# Patient Record
Sex: FEMALE | Race: WHITE | NOT HISPANIC OR LATINO
[De-identification: names, ages, dates, MRNs, and addresses within clinical notes are randomized per-mention and may not be internally consistent; named-entity substitution may affect disease eponyms.]

---

## 2021-05-18 ENCOUNTER — RESULT REVIEW (OUTPATIENT)
Age: 26
End: 2021-05-18

## 2021-05-18 ENCOUNTER — OUTPATIENT (OUTPATIENT)
Dept: OUTPATIENT SERVICES | Facility: HOSPITAL | Age: 26
LOS: 1 days | End: 2021-05-18
Payer: COMMERCIAL

## 2021-05-18 VITALS
HEART RATE: 88 BPM | TEMPERATURE: 99 F | OXYGEN SATURATION: 99 % | WEIGHT: 179.9 LBS | DIASTOLIC BLOOD PRESSURE: 86 MMHG | RESPIRATION RATE: 16 BRPM | SYSTOLIC BLOOD PRESSURE: 130 MMHG

## 2021-05-18 DIAGNOSIS — Z90.49 ACQUIRED ABSENCE OF OTHER SPECIFIED PARTS OF DIGESTIVE TRACT: Chronic | ICD-10-CM

## 2021-05-18 DIAGNOSIS — Z01.818 ENCOUNTER FOR OTHER PREPROCEDURAL EXAMINATION: ICD-10-CM

## 2021-05-18 DIAGNOSIS — N87.1 MODERATE CERVICAL DYSPLASIA: ICD-10-CM

## 2021-05-18 DIAGNOSIS — R87.810 CERVICAL HIGH RISK HUMAN PAPILLOMAVIRUS (HPV) DNA TEST POSITIVE: ICD-10-CM

## 2021-05-18 LAB
HCG SERPL-ACNC: <1 MIU/ML — SIGNIFICANT CHANGE UP
HCT VFR BLD CALC: 38.7 % — SIGNIFICANT CHANGE UP (ref 34.5–45)
HGB BLD-MCNC: 13 G/DL — SIGNIFICANT CHANGE UP (ref 11.5–15.5)
MCHC RBC-ENTMCNC: 31.6 PG — SIGNIFICANT CHANGE UP (ref 27–34)
MCHC RBC-ENTMCNC: 33.6 GM/DL — SIGNIFICANT CHANGE UP (ref 32–36)
MCV RBC AUTO: 93.9 FL — SIGNIFICANT CHANGE UP (ref 80–100)
NRBC # BLD: 0 /100 WBCS — SIGNIFICANT CHANGE UP (ref 0–0)
PLATELET # BLD AUTO: 230 K/UL — SIGNIFICANT CHANGE UP (ref 150–400)
RBC # BLD: 4.12 M/UL — SIGNIFICANT CHANGE UP (ref 3.8–5.2)
RBC # FLD: 12.7 % — SIGNIFICANT CHANGE UP (ref 10.3–14.5)
WBC # BLD: 4.47 K/UL — SIGNIFICANT CHANGE UP (ref 3.8–10.5)
WBC # FLD AUTO: 4.47 K/UL — SIGNIFICANT CHANGE UP (ref 3.8–10.5)

## 2021-05-18 PROCEDURE — 88321 CONSLTJ&REPRT SLD PREP ELSWR: CPT

## 2021-05-18 PROCEDURE — G0463: CPT

## 2021-05-18 PROCEDURE — 84702 CHORIONIC GONADOTROPIN TEST: CPT

## 2021-05-18 PROCEDURE — 85027 COMPLETE CBC AUTOMATED: CPT

## 2021-05-18 PROCEDURE — 36415 COLL VENOUS BLD VENIPUNCTURE: CPT

## 2021-05-18 RX ORDER — OMEPRAZOLE 10 MG/1
0 CAPSULE, DELAYED RELEASE ORAL
Qty: 0 | Refills: 0 | DISCHARGE

## 2021-05-18 RX ORDER — NORETHINDRONE AND ETHINYL ESTRADIOL 0.4-0.035
0 KIT ORAL
Qty: 0 | Refills: 1 | DISCHARGE

## 2021-05-18 RX ORDER — DIPHENHYDRAMINE HCL 50 MG
1 CAPSULE ORAL
Qty: 0 | Refills: 0 | DISCHARGE

## 2021-05-18 NOTE — H&P PST ADULT - HEART RATE (BEATS/MIN)
Day surgery calling regarding orders being entered for an OB provider being present with her in surgery on 7/3. Patient has not been made aware of a provider being present and there have been no orders entered for Fetal heart tone monitoring. Day surgery would like a call back regarding which doctor will be present.   
Day surgery needs orders placed or called in to them for a pt having surgery July 3. RN messaged thru [facility]'s  ZAIDA Galvan  to have him call Vicki in day surgery at ext [phone] to give verbal orders. She states she will leave note on his desk. He is at [facility] today  
[phone]

## 2021-05-18 NOTE — H&P PST ADULT - HISTORY OF PRESENT ILLNESS
25 y/o female with no PMH here for PST. Pt states she had an abnormal PAP smear, s/p colposcopy and "pathology showed CLARA 2-3". Pt electing for leep procedure, endocervical curettage on 5/26/2021.

## 2021-05-18 NOTE — H&P PST ADULT - NSANTHOSAYNRD_GEN_A_CORE
No. RIKY screening performed.  STOP BANG Legend: 0-2 = LOW Risk; 3-4 = INTERMEDIATE Risk; 5-8 = HIGH Risk

## 2021-05-18 NOTE — H&P PST ADULT - NSICDXFAMILYHX_GEN_ALL_CORE_FT
FAMILY HISTORY:  Father  Still living? Yes, Estimated age: Age Unknown  Family history of kidney stones, Age at diagnosis: Age Unknown

## 2021-05-18 NOTE — H&P PST ADULT - GENERAL COMMENTS
Pt denies having traveled outside the country for the last 14 days. Pt denies having had the COVID19 infection and denies COVID19 positive contacts within the last 14 days. Pt received the 2nd dose of the Pfizer COVID19 vaccine on 4/2/2021.

## 2021-05-18 NOTE — H&P PST ADULT - NSICDXPROBLEM_GEN_ALL_CORE_FT
PROBLEM DIAGNOSES  Problem: Moderate cervical dysplasia  Assessment and Plan: Leep procedure, endocervical curettage on 5/26/2021.       Problem: Preoperative testing  Assessment and Plan: No medical clearance needed as per surgeon. CBC and HCG ordered. Pre-op instructions given and pt verbalized understanding. Pt had the 2nd dose of the Pfizer COVID19 vaccine on 4/2/2021 and does not need to have the COVID19 PCR swab before surgery.

## 2021-05-18 NOTE — H&P PST ADULT - NSICDXPASTMEDICALHX_GEN_ALL_CORE_FT
PAST MEDICAL HISTORY:  GERD (gastroesophageal reflux disease)     Insomnia     Moderate cervical dysplasia

## 2021-05-19 LAB — SURGICAL PATHOLOGY STUDY: SIGNIFICANT CHANGE UP

## 2021-05-23 ENCOUNTER — APPOINTMENT (OUTPATIENT)
Dept: DISASTER EMERGENCY | Facility: CLINIC | Age: 26
End: 2021-05-23

## 2023-06-22 PROBLEM — K21.9 GASTRO-ESOPHAGEAL REFLUX DISEASE WITHOUT ESOPHAGITIS: Chronic | Status: ACTIVE | Noted: 2021-05-18

## 2023-06-22 PROBLEM — Z00.00 ENCOUNTER FOR PREVENTIVE HEALTH EXAMINATION: Status: ACTIVE | Noted: 2023-06-22

## 2023-06-22 PROBLEM — G47.00 INSOMNIA, UNSPECIFIED: Chronic | Status: ACTIVE | Noted: 2021-05-18

## 2023-06-22 PROBLEM — N87.1 MODERATE CERVICAL DYSPLASIA: Chronic | Status: ACTIVE | Noted: 2021-05-18

## 2023-06-23 ENCOUNTER — APPOINTMENT (OUTPATIENT)
Dept: ORTHOPEDIC SURGERY | Facility: CLINIC | Age: 28
End: 2023-06-23
Payer: COMMERCIAL

## 2023-06-23 VITALS — WEIGHT: 170 LBS | BODY MASS INDEX: 28.32 KG/M2 | HEIGHT: 65 IN

## 2023-06-23 DIAGNOSIS — Z78.9 OTHER SPECIFIED HEALTH STATUS: ICD-10-CM

## 2023-06-23 DIAGNOSIS — F17.200 NICOTINE DEPENDENCE, UNSPECIFIED, UNCOMPLICATED: ICD-10-CM

## 2023-06-23 DIAGNOSIS — M23.91 UNSPECIFIED INTERNAL DERANGEMENT OF RIGHT KNEE: ICD-10-CM

## 2023-06-23 PROCEDURE — 99203 OFFICE O/P NEW LOW 30 MIN: CPT

## 2023-06-23 PROCEDURE — 73564 X-RAY EXAM KNEE 4 OR MORE: CPT | Mod: RT

## 2023-06-23 RX ORDER — OMEPRAZOLE 100 %
POWDER (GRAM) MISCELLANEOUS
Refills: 0 | Status: ACTIVE | COMMUNITY

## 2023-06-23 NOTE — DISCUSSION/SUMMARY
[de-identified] : 27yo F with Right medial knee pain s/p trip and fall with episodes of instability and positive lxey on exam \par 1) MRI to eval MMT, LMT, MCL \par 2) cryotherapy, nsaids prn\par 3) rtc after MRI to review results \par

## 2023-06-23 NOTE — HISTORY OF PRESENT ILLNESS
[5] : 5 [Tightness] : tightness [Leisure] : leisure [Work] : work [Meds] : meds [Walking] : walking [Exercising] : exercising [de-identified] : 6/23/23: Patient is a 27yo F presenting for R knee pain s/p fall onto R knee x 2 weeks ago. Patient states she got caught in the gap when entering the train and her R knee twisted and fell onto the ground. Initially, patient has some bruising, but had good movements but admits over the last two weeks her knee has "stiffened" causing pain with bending and flexing. Took Tylenol and ice with some relief. Admits to intermittent episodes of the knee locking up. 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 H/O two years ago impact on boat door. Pt stated two weeks ago she tripped and fell on the knee side ways. No N/T. ROM is slightly limited.  [FreeTextEntry7] : up the thigh [FreeTextEntry9] : Tylenol/ brace

## 2023-06-23 NOTE — PHYSICAL EXAM
[5___] : hamstring 5[unfilled]/5 [Positive] : positive Elvia [Right] : right knee [All Views] : anteroposterior, lateral, skyline, and anteroposterior standing [There are no fractures, subluxations or dislocations. No significant abnormalities are seen] : There are no fractures, subluxations or dislocations. No significant abnormalities are seen [] : negative Lachmann [TWNoteComboBox7] : flexion 130 degrees [de-identified] : extension 3 degrees

## 2023-07-07 ENCOUNTER — TRANSCRIPTION ENCOUNTER (OUTPATIENT)
Age: 28
End: 2023-07-07

## 2023-07-07 ENCOUNTER — RESULT REVIEW (OUTPATIENT)
Age: 28
End: 2023-07-07

## 2023-07-27 ENCOUNTER — APPOINTMENT (OUTPATIENT)
Dept: ORTHOPEDIC SURGERY | Facility: CLINIC | Age: 28
End: 2023-07-27
Payer: COMMERCIAL

## 2023-07-27 VITALS — WEIGHT: 170 LBS | HEIGHT: 65 IN | BODY MASS INDEX: 28.32 KG/M2

## 2023-07-27 DIAGNOSIS — S83.411A SPRAIN OF MEDIAL COLLATERAL LIGAMENT OF RIGHT KNEE, INITIAL ENCOUNTER: ICD-10-CM

## 2023-07-27 PROCEDURE — 99214 OFFICE O/P EST MOD 30 MIN: CPT

## 2023-07-27 NOTE — HISTORY OF PRESENT ILLNESS
[5] : 5 [Tightness] : tightness [Leisure] : leisure [Work] : work [Meds] : meds [Walking] : walking [Exercising] : exercising [de-identified] : 6/23/23: Patient is a 29yo F presenting for R knee pain s/p fall onto R knee x 2 weeks ago. Patient states she got caught in the gap when entering the train and her R knee twisted and fell onto the ground. Initially, patient has some bruising, but had good movements but admits over the last two weeks her knee has "stiffened" causing pain with bending and flexing. Took Tylenol and ice with some relief. Admits to intermittent episodes of the knee locking up. 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 Here to go over MRI Review  [FreeTextEntry7] : up the thigh [FreeTextEntry9] : Tylenol/ brace

## 2023-07-27 NOTE — DISCUSSION/SUMMARY
[de-identified] : 27yo F with MCL sprain. MRI reviewed today. \par 1) start PT \par 2) advised patient to obtain playmaker brace to wear at all times. \par 3) rtc 4 weeks \par 4) cryotherapy\par 5) activity  modification

## 2023-07-27 NOTE — DATA REVIEWED
[MRI] : MRI [Right] : of the right [Knee] : knee [I independently reviewed and interpreted images and report] : I independently reviewed and interpreted images and report [I reviewed the films/CD] : I reviewed the films/CD [FreeTextEntry1] : MCL sprain and partial tearing

## 2023-08-28 ENCOUNTER — APPOINTMENT (OUTPATIENT)
Dept: ORTHOPEDIC SURGERY | Facility: CLINIC | Age: 28
End: 2023-08-28

## 2024-01-25 NOTE — H&P PST ADULT - HEIGHT IN INCHES
no lesions, no deformities, no traumatic injuries, no significant scars are present, chest wall non-tender, no masses present, breathing is unlabored without accessory muscle use,normal breath sounds
65